# Patient Record
Sex: MALE | Race: OTHER | URBAN - METROPOLITAN AREA
[De-identification: names, ages, dates, MRNs, and addresses within clinical notes are randomized per-mention and may not be internally consistent; named-entity substitution may affect disease eponyms.]

---

## 2018-12-17 ENCOUNTER — INPATIENT (INPATIENT)
Facility: HOSPITAL | Age: 53
LOS: 3 days | Discharge: HOME | End: 2018-12-21
Attending: INTERNAL MEDICINE | Admitting: INTERNAL MEDICINE
Payer: COMMERCIAL

## 2018-12-17 VITALS
OXYGEN SATURATION: 99 % | RESPIRATION RATE: 19 BRPM | DIASTOLIC BLOOD PRESSURE: 92 MMHG | TEMPERATURE: 100 F | HEIGHT: 68 IN | HEART RATE: 144 BPM | SYSTOLIC BLOOD PRESSURE: 125 MMHG | WEIGHT: 145.06 LBS

## 2018-12-17 LAB
ALBUMIN SERPL ELPH-MCNC: 2.4 G/DL — LOW (ref 3.3–5)
ALBUMIN SERPL ELPH-MCNC: 2.7 G/DL — LOW (ref 3.3–5)
ALP SERPL-CCNC: 132 U/L — HIGH (ref 40–120)
ALP SERPL-CCNC: 158 U/L — HIGH (ref 40–120)
ALT FLD-CCNC: 12 U/L — SIGNIFICANT CHANGE UP (ref 12–78)
ALT FLD-CCNC: 15 U/L — SIGNIFICANT CHANGE UP (ref 12–78)
AMYLASE P1 CFR SERPL: 35 U/L — SIGNIFICANT CHANGE UP (ref 25–115)
ANION GAP SERPL CALC-SCNC: 11 MMOL/L — SIGNIFICANT CHANGE UP (ref 5–17)
ANION GAP SERPL CALC-SCNC: 9 MMOL/L — SIGNIFICANT CHANGE UP (ref 5–17)
APTT BLD: 28.9 SEC — SIGNIFICANT CHANGE UP (ref 27.5–36.3)
AST SERPL-CCNC: 26 U/L — SIGNIFICANT CHANGE UP (ref 15–37)
AST SERPL-CCNC: 31 U/L — SIGNIFICANT CHANGE UP (ref 15–37)
BASOPHILS # BLD AUTO: 0.02 K/UL — SIGNIFICANT CHANGE UP (ref 0–0.2)
BASOPHILS NFR BLD AUTO: 0.2 % — SIGNIFICANT CHANGE UP (ref 0–2)
BILIRUB DIRECT SERPL-MCNC: 0.51 MG/DL — HIGH (ref 0.05–0.2)
BILIRUB INDIRECT FLD-MCNC: 0.6 MG/DL — SIGNIFICANT CHANGE UP (ref 0.2–1)
BILIRUB SERPL-MCNC: 1.1 MG/DL — SIGNIFICANT CHANGE UP (ref 0.2–1.2)
BILIRUB SERPL-MCNC: 1.5 MG/DL — HIGH (ref 0.2–1.2)
BUN SERPL-MCNC: 13 MG/DL — SIGNIFICANT CHANGE UP (ref 7–23)
BUN SERPL-MCNC: 14 MG/DL — SIGNIFICANT CHANGE UP (ref 7–23)
CALCIUM SERPL-MCNC: 7.8 MG/DL — LOW (ref 8.5–10.1)
CALCIUM SERPL-MCNC: 8.5 MG/DL — SIGNIFICANT CHANGE UP (ref 8.5–10.1)
CHLORIDE SERPL-SCNC: 87 MMOL/L — LOW (ref 96–108)
CHLORIDE SERPL-SCNC: 95 MMOL/L — LOW (ref 96–108)
CK MB BLD-MCNC: 1.2 % — SIGNIFICANT CHANGE UP (ref 0–3.5)
CK MB CFR SERPL CALC: 1.1 NG/ML — SIGNIFICANT CHANGE UP (ref 0.5–3.6)
CK SERPL-CCNC: 91 U/L — SIGNIFICANT CHANGE UP (ref 26–308)
CO2 SERPL-SCNC: 31 MMOL/L — SIGNIFICANT CHANGE UP (ref 22–31)
CO2 SERPL-SCNC: 33 MMOL/L — HIGH (ref 22–31)
CREAT SERPL-MCNC: 1.1 MG/DL — SIGNIFICANT CHANGE UP (ref 0.5–1.3)
CREAT SERPL-MCNC: 1.24 MG/DL — SIGNIFICANT CHANGE UP (ref 0.5–1.3)
EOSINOPHIL # BLD AUTO: 0.01 K/UL — SIGNIFICANT CHANGE UP (ref 0–0.5)
EOSINOPHIL NFR BLD AUTO: 0.1 % — SIGNIFICANT CHANGE UP (ref 0–6)
ETHANOL SERPL-MCNC: <10 MG/DL — SIGNIFICANT CHANGE UP (ref 0–10)
GLUCOSE BLDC GLUCOMTR-MCNC: 48 MG/DL — LOW (ref 70–99)
GLUCOSE SERPL-MCNC: 81 MG/DL — SIGNIFICANT CHANGE UP (ref 70–99)
GLUCOSE SERPL-MCNC: 93 MG/DL — SIGNIFICANT CHANGE UP (ref 70–99)
HCT VFR BLD CALC: 39.7 % — SIGNIFICANT CHANGE UP (ref 39–50)
HGB BLD-MCNC: 13.1 G/DL — SIGNIFICANT CHANGE UP (ref 13–17)
IMM GRANULOCYTES NFR BLD AUTO: 0.9 % — SIGNIFICANT CHANGE UP (ref 0–1.5)
INR BLD: 1.03 RATIO — SIGNIFICANT CHANGE UP (ref 0.88–1.16)
LIDOCAIN IGE QN: 35 U/L — LOW (ref 73–393)
LYMPHOCYTES # BLD AUTO: 1.08 K/UL — SIGNIFICANT CHANGE UP (ref 1–3.3)
LYMPHOCYTES # BLD AUTO: 10.7 % — LOW (ref 13–44)
MAGNESIUM SERPL-MCNC: 1.3 MG/DL — LOW (ref 1.6–2.6)
MCHC RBC-ENTMCNC: 32.3 PG — SIGNIFICANT CHANGE UP (ref 27–34)
MCHC RBC-ENTMCNC: 33 GM/DL — SIGNIFICANT CHANGE UP (ref 32–36)
MCV RBC AUTO: 98 FL — SIGNIFICANT CHANGE UP (ref 80–100)
MONOCYTES # BLD AUTO: 1.23 K/UL — HIGH (ref 0–0.9)
MONOCYTES NFR BLD AUTO: 12.2 % — SIGNIFICANT CHANGE UP (ref 2–14)
NEUTROPHILS # BLD AUTO: 7.69 K/UL — HIGH (ref 1.8–7.4)
NEUTROPHILS NFR BLD AUTO: 75.9 % — SIGNIFICANT CHANGE UP (ref 43–77)
NRBC # BLD: 0 /100 WBCS — SIGNIFICANT CHANGE UP (ref 0–0)
NT-PROBNP SERPL-SCNC: 434 PG/ML — HIGH (ref 0–125)
PHOSPHATE SERPL-MCNC: 2.5 MG/DL — SIGNIFICANT CHANGE UP (ref 2.5–4.5)
PLATELET # BLD AUTO: 118 K/UL — LOW (ref 150–400)
POTASSIUM SERPL-MCNC: 2.9 MMOL/L — CRITICAL LOW (ref 3.5–5.3)
POTASSIUM SERPL-MCNC: 3.4 MMOL/L — LOW (ref 3.5–5.3)
POTASSIUM SERPL-SCNC: 2.9 MMOL/L — CRITICAL LOW (ref 3.5–5.3)
POTASSIUM SERPL-SCNC: 3.4 MMOL/L — LOW (ref 3.5–5.3)
PROT SERPL-MCNC: 7 GM/DL — SIGNIFICANT CHANGE UP (ref 6–8.3)
PROT SERPL-MCNC: 8 GM/DL — SIGNIFICANT CHANGE UP (ref 6–8.3)
PROTHROM AB SERPL-ACNC: 11.5 SEC — SIGNIFICANT CHANGE UP (ref 10–12.9)
RBC # BLD: 4.05 M/UL — LOW (ref 4.2–5.8)
RBC # FLD: 13.6 % — SIGNIFICANT CHANGE UP (ref 10.3–14.5)
SODIUM SERPL-SCNC: 131 MMOL/L — LOW (ref 135–145)
SODIUM SERPL-SCNC: 135 MMOL/L — SIGNIFICANT CHANGE UP (ref 135–145)
TROPONIN I SERPL-MCNC: <.015 NG/ML — SIGNIFICANT CHANGE UP (ref 0.01–0.04)
WBC # BLD: 10.12 K/UL — SIGNIFICANT CHANGE UP (ref 3.8–10.5)
WBC # FLD AUTO: 10.12 K/UL — SIGNIFICANT CHANGE UP (ref 3.8–10.5)

## 2018-12-17 PROCEDURE — 71045 X-RAY EXAM CHEST 1 VIEW: CPT | Mod: 26

## 2018-12-17 PROCEDURE — 99223 1ST HOSP IP/OBS HIGH 75: CPT

## 2018-12-17 PROCEDURE — 93010 ELECTROCARDIOGRAM REPORT: CPT

## 2018-12-17 PROCEDURE — 99285 EMERGENCY DEPT VISIT HI MDM: CPT

## 2018-12-17 RX ORDER — POTASSIUM CHLORIDE 20 MEQ
10 PACKET (EA) ORAL
Qty: 0 | Refills: 0 | Status: COMPLETED | OUTPATIENT
Start: 2018-12-17 | End: 2018-12-17

## 2018-12-17 RX ORDER — DEXTROSE 50 % IN WATER 50 %
25 SYRINGE (ML) INTRAVENOUS ONCE
Qty: 0 | Refills: 0 | Status: COMPLETED | OUTPATIENT
Start: 2018-12-17 | End: 2018-12-17

## 2018-12-17 RX ORDER — DEXTROSE 50 % IN WATER 50 %
15 SYRINGE (ML) INTRAVENOUS ONCE
Qty: 0 | Refills: 0 | Status: DISCONTINUED | OUTPATIENT
Start: 2018-12-17 | End: 2018-12-21

## 2018-12-17 RX ORDER — DEXTROSE 50 % IN WATER 50 %
25 SYRINGE (ML) INTRAVENOUS ONCE
Qty: 0 | Refills: 0 | Status: DISCONTINUED | OUTPATIENT
Start: 2018-12-17 | End: 2018-12-21

## 2018-12-17 RX ORDER — SODIUM CHLORIDE 9 MG/ML
2000 INJECTION INTRAMUSCULAR; INTRAVENOUS; SUBCUTANEOUS ONCE
Qty: 0 | Refills: 0 | Status: COMPLETED | OUTPATIENT
Start: 2018-12-17 | End: 2018-12-17

## 2018-12-17 RX ORDER — ONDANSETRON 8 MG/1
4 TABLET, FILM COATED ORAL ONCE
Qty: 0 | Refills: 0 | Status: COMPLETED | OUTPATIENT
Start: 2018-12-17 | End: 2018-12-17

## 2018-12-17 RX ORDER — INSULIN LISPRO 100/ML
VIAL (ML) SUBCUTANEOUS AT BEDTIME
Qty: 0 | Refills: 0 | Status: DISCONTINUED | OUTPATIENT
Start: 2018-12-17 | End: 2018-12-21

## 2018-12-17 RX ORDER — GLUCAGON INJECTION, SOLUTION 0.5 MG/.1ML
1 INJECTION, SOLUTION SUBCUTANEOUS ONCE
Qty: 0 | Refills: 0 | Status: DISCONTINUED | OUTPATIENT
Start: 2018-12-17 | End: 2018-12-21

## 2018-12-17 RX ORDER — SODIUM CHLORIDE 9 MG/ML
1000 INJECTION, SOLUTION INTRAVENOUS
Qty: 0 | Refills: 0 | Status: DISCONTINUED | OUTPATIENT
Start: 2018-12-17 | End: 2018-12-19

## 2018-12-17 RX ORDER — ACETAMINOPHEN 500 MG
650 TABLET ORAL EVERY 6 HOURS
Qty: 0 | Refills: 0 | Status: DISCONTINUED | OUTPATIENT
Start: 2018-12-17 | End: 2018-12-21

## 2018-12-17 RX ORDER — FAMOTIDINE 10 MG/ML
20 INJECTION INTRAVENOUS ONCE
Qty: 0 | Refills: 0 | Status: COMPLETED | OUTPATIENT
Start: 2018-12-17 | End: 2018-12-17

## 2018-12-17 RX ORDER — INSULIN LISPRO 100/ML
VIAL (ML) SUBCUTANEOUS
Qty: 0 | Refills: 0 | Status: DISCONTINUED | OUTPATIENT
Start: 2018-12-17 | End: 2018-12-21

## 2018-12-17 RX ORDER — FAMOTIDINE 10 MG/ML
20 INJECTION INTRAVENOUS ONCE
Qty: 0 | Refills: 0 | Status: DISCONTINUED | OUTPATIENT
Start: 2018-12-17 | End: 2018-12-17

## 2018-12-17 RX ORDER — DEXTROSE 50 % IN WATER 50 %
12.5 SYRINGE (ML) INTRAVENOUS ONCE
Qty: 0 | Refills: 0 | Status: DISCONTINUED | OUTPATIENT
Start: 2018-12-17 | End: 2018-12-21

## 2018-12-17 RX ADMIN — Medication 2 MILLIGRAM(S): at 18:27

## 2018-12-17 RX ADMIN — Medication 100 MILLIEQUIVALENT(S): at 15:24

## 2018-12-17 RX ADMIN — Medication 2 MILLIGRAM(S): at 15:24

## 2018-12-17 RX ADMIN — Medication 2 MILLIGRAM(S): at 13:21

## 2018-12-17 RX ADMIN — FAMOTIDINE 20 MILLIGRAM(S): 10 INJECTION INTRAVENOUS at 13:21

## 2018-12-17 RX ADMIN — Medication 2 MILLIGRAM(S): at 22:55

## 2018-12-17 RX ADMIN — Medication 25 GRAM(S): at 23:26

## 2018-12-17 RX ADMIN — Medication 2 MILLIGRAM(S): at 18:47

## 2018-12-17 RX ADMIN — Medication 2 MILLIGRAM(S): at 21:26

## 2018-12-17 RX ADMIN — Medication 100 MILLIEQUIVALENT(S): at 17:49

## 2018-12-17 RX ADMIN — Medication 100 MILLIEQUIVALENT(S): at 13:54

## 2018-12-17 RX ADMIN — SODIUM CHLORIDE 2000 MILLILITER(S): 9 INJECTION INTRAMUSCULAR; INTRAVENOUS; SUBCUTANEOUS at 13:21

## 2018-12-17 RX ADMIN — ONDANSETRON 4 MILLIGRAM(S): 8 TABLET, FILM COATED ORAL at 13:21

## 2018-12-17 NOTE — ED PROVIDER NOTE - OBJECTIVE STATEMENT
53 yo male with history of alcohol use presents with chest pain x3 days. Patient states his last alcoholic beverage was over one week ago. Patient states that chest pain has been constant and cannot provide pain scale number. Patient denies sob, abdominal pain.

## 2018-12-17 NOTE — ED PROVIDER NOTE - PSYCHIATRIC, MLM
Pt bladder scan shows 237cc. Alert and oriented to person, place, time/situation. normal mood and affect. no apparent risk to self or others.

## 2018-12-17 NOTE — H&P ADULT - ASSESSMENT
13.1   10.12 )-----------( 118      ( 17 Dec 2018 13:11 )             39.7   12-17    131<L>  |  87<L>  |  13  ----------------------------<  93  2.9<LL>   |  33<H>  |  1.24    Ca    8.5      17 Dec 2018 13:11    TPro  8.0  /  Alb  2.7<L>  /  TBili  1.5<H>  /  DBili  x   /  AST  31  /  ALT  15  /  AlkPhos  158<H>  12-17  < from: Xray Chest 1 View- PORTABLE-Urgent (12.17.18 @ 14:52) >    Lungs: The lungs are clear.  Heart: The heart is normal in size.  Mediastinum: The mediastinum is within normal limits.    There are bilateral chronic rib deformities.      < end of copied text >

## 2018-12-17 NOTE — ED PROVIDER NOTE - CARE PLAN
Principal Discharge DX:	Alcohol withdrawal  Secondary Diagnosis:	Electrolyte imbalance  Secondary Diagnosis:	Vomiting

## 2018-12-17 NOTE — H&P ADULT - NSHPLABSRESULTS_GEN_ALL_CORE
13.1   10.12 )-----------( 118      ( 17 Dec 2018 13:11 )             39.7   12-17    131<L>  |  87<L>  |  13  ----------------------------<  93  2.9<LL>   |  33<H>  |  1.24    Ca    8.5      17 Dec 2018 13:11    TPro  8.0  /  Alb  2.7<L>  /  TBili  1.5<H>  /  DBili  x   /  AST  31  /  ALT  15  /  AlkPhos  158<H>  12-17 13.1   10.12 )-----------( 118      ( 17 Dec 2018 13:11 )             39.7   12-17    131<L>  |  87<L>  |  13  ----------------------------<  93  2.9<LL>   |  33<H>  |  1.24    Ca    8.5      17 Dec 2018 13:11    TPro  8.0  /  Alb  2.7<L>  /  TBili  1.5<H>  /  DBili  x   /  AST  31  /  ALT  15  /  AlkPhos  158<H>  12-17    trop- NEGATIVE

## 2018-12-17 NOTE — H&P ADULT - NSHPPHYSICALEXAM_GEN_ALL_CORE
GENERAL: NAD well-developed  HEAD:  Atraumatic, Normocephalic  EYES: EOMI, PERRLA, conjunctiva and sclera clear  ENMT: No tonsillar erythema, exudates, or enlargement; Moist mucous membranes, Good dentition, No lesions  NECK: Supple, No JVD, Normal thyroid  NERVOUS SYSTEM:  Alert & Oriented X3, Good concentration; Motor Strength 5/5 B/L upper and lower extremities; DTRs 2+ intact and symmetric  CHEST/LUNG: Clear to percussion bilaterally; No rales, rhonchi, wheezing, or rubs  HEART: Regular rate and rhythm; No murmurs, rubs, or gallops  ABDOMEN: Soft, Nontender, Nondistended; Bowel sounds present  EXTREMITIES:  2+ Peripheral Pulses, No clubbing, cyanosis, or edema  LYMPH: No lymphadenopathy   SKIN: No rashes or lesions ICU Vital Signs Last 24 Hrs  T(C): 36.9 (18 Dec 2018 04:40), Max: 37.7 (17 Dec 2018 12:30)  T(F): 98.4 (18 Dec 2018 04:40), Max: 99.8 (17 Dec 2018 12:30)  HR: 111 (18 Dec 2018 04:40) (111 - 144)  BP: 148/99 (18 Dec 2018 04:40) (125/83 - 159/104)  BP(mean): 98 (17 Dec 2018 15:28) (98 - 98)  ABP: --  ABP(mean): --  RR: 18 (18 Dec 2018 04:40) (14 - 19)  SpO2: 96% (18 Dec 2018 04:40) (96% - 99%)    GENERAL: NAD well-developed  HEAD:  Atraumatic, Normocephalic  EYES: EOMI, PERRLA, conjunctiva and sclera clear  ENMT: No tonsillar erythema, exudates, or enlargement; Moist mucous membranes, Good dentition, No lesions  NECK: Supple, No JVD, Normal thyroid  NERVOUS SYSTEM:  Alert & Oriented X3, Good concentration; Motor Strength 5/5 B/L upper and lower extremities; DTRs 2+ intact and symmetric  CHEST/LUNG: Clear to percussion bilaterally; No rales, rhonchi, wheezing, or rubs  HEART: Regular rate and rhythm; No murmurs, rubs, or gallops  ABDOMEN: Soft, mild upper epigastric tender, Nondistended; Bowel sounds present  EXTREMITIES:  2+ Peripheral Pulses, No clubbing, cyanosis, or edema  LYMPH: No lymphadenopathy   SKIN: No rashes or lesions

## 2018-12-17 NOTE — H&P ADULT - HISTORY OF PRESENT ILLNESS
51 yo male with history of alcohol use presents with chest pain x3 days. Patient states his last alcoholic beverage was over one week ago. Patient states that chest pain has been constant and cannot provide pain scale number. Patient denies sob, abdominal pain. 53 yo male with history of alcohol use presents with chest pain x3 days. Patient states his last alcoholic beverage was over one week ago. Patient states that chest pain has been constant and cannot provide pain scale number. Patient denies sob, abdominal pain.- patient describes the pain as sharp without short of breath and localized in upper epigastric area with constant pain x three days

## 2018-12-17 NOTE — H&P ADULT - NSHPREVIEWOFSYSTEMS_GEN_ALL_CORE

## 2018-12-17 NOTE — ED ADULT TRIAGE NOTE - CHIEF COMPLAINT QUOTE
patient c/o of  chest and epigastric pain , with N/V started 3 days ago , patient admitted drinking  a lot , last drink 3 days ago

## 2018-12-18 DIAGNOSIS — F10.230 ALCOHOL DEPENDENCE WITH WITHDRAWAL, UNCOMPLICATED: ICD-10-CM

## 2018-12-18 DIAGNOSIS — R07.89 OTHER CHEST PAIN: ICD-10-CM

## 2018-12-18 DIAGNOSIS — E11.9 TYPE 2 DIABETES MELLITUS WITHOUT COMPLICATIONS: ICD-10-CM

## 2018-12-18 DIAGNOSIS — R11.10 VOMITING, UNSPECIFIED: ICD-10-CM

## 2018-12-18 DIAGNOSIS — E87.8 OTHER DISORDERS OF ELECTROLYTE AND FLUID BALANCE, NOT ELSEWHERE CLASSIFIED: ICD-10-CM

## 2018-12-18 LAB
ANION GAP SERPL CALC-SCNC: 11 MMOL/L — SIGNIFICANT CHANGE UP (ref 5–17)
BUN SERPL-MCNC: 14 MG/DL — SIGNIFICANT CHANGE UP (ref 7–23)
CALCIUM SERPL-MCNC: 8.2 MG/DL — LOW (ref 8.5–10.1)
CHLORIDE SERPL-SCNC: 96 MMOL/L — SIGNIFICANT CHANGE UP (ref 96–108)
CO2 SERPL-SCNC: 30 MMOL/L — SIGNIFICANT CHANGE UP (ref 22–31)
CREAT SERPL-MCNC: 0.82 MG/DL — SIGNIFICANT CHANGE UP (ref 0.5–1.3)
GLUCOSE BLDC GLUCOMTR-MCNC: 169 MG/DL — HIGH (ref 70–99)
GLUCOSE BLDC GLUCOMTR-MCNC: 83 MG/DL — SIGNIFICANT CHANGE UP (ref 70–99)
GLUCOSE SERPL-MCNC: 34 MG/DL — CRITICAL LOW (ref 70–99)
HBA1C BLD-MCNC: 6.8 % — HIGH (ref 4–5.6)
HCT VFR BLD CALC: 32.3 % — LOW (ref 39–50)
HGB BLD-MCNC: 10.9 G/DL — LOW (ref 13–17)
LACTATE SERPL-SCNC: 0.7 MMOL/L — SIGNIFICANT CHANGE UP (ref 0.7–2)
MAGNESIUM SERPL-MCNC: 1.4 MG/DL — LOW (ref 1.6–2.6)
MCHC RBC-ENTMCNC: 33.7 GM/DL — SIGNIFICANT CHANGE UP (ref 32–36)
MCHC RBC-ENTMCNC: 33.7 PG — SIGNIFICANT CHANGE UP (ref 27–34)
MCV RBC AUTO: 100 FL — SIGNIFICANT CHANGE UP (ref 80–100)
NRBC # BLD: 0 /100 WBCS — SIGNIFICANT CHANGE UP (ref 0–0)
PHOSPHATE SERPL-MCNC: 2.5 MG/DL — SIGNIFICANT CHANGE UP (ref 2.5–4.5)
PLATELET # BLD AUTO: 123 K/UL — LOW (ref 150–400)
POTASSIUM SERPL-MCNC: 3 MMOL/L — LOW (ref 3.5–5.3)
POTASSIUM SERPL-SCNC: 3 MMOL/L — LOW (ref 3.5–5.3)
RAPID RVP RESULT: SIGNIFICANT CHANGE UP
RBC # BLD: 3.23 M/UL — LOW (ref 4.2–5.8)
RBC # FLD: 13.6 % — SIGNIFICANT CHANGE UP (ref 10.3–14.5)
SODIUM SERPL-SCNC: 137 MMOL/L — SIGNIFICANT CHANGE UP (ref 135–145)
TROPONIN I SERPL-MCNC: <.015 NG/ML — SIGNIFICANT CHANGE UP (ref 0.01–0.04)
WBC # BLD: 5.47 K/UL — SIGNIFICANT CHANGE UP (ref 3.8–10.5)
WBC # FLD AUTO: 5.47 K/UL — SIGNIFICANT CHANGE UP (ref 3.8–10.5)

## 2018-12-18 PROCEDURE — 99233 SBSQ HOSP IP/OBS HIGH 50: CPT

## 2018-12-18 RX ORDER — CHLORPROMAZINE HCL 10 MG
25 TABLET ORAL EVERY 8 HOURS
Qty: 0 | Refills: 0 | Status: DISCONTINUED | OUTPATIENT
Start: 2018-12-18 | End: 2018-12-18

## 2018-12-18 RX ORDER — SODIUM CHLORIDE 9 MG/ML
2000 INJECTION INTRAMUSCULAR; INTRAVENOUS; SUBCUTANEOUS ONCE
Qty: 0 | Refills: 0 | Status: COMPLETED | OUTPATIENT
Start: 2018-12-18 | End: 2018-12-18

## 2018-12-18 RX ORDER — CHLORPROMAZINE HCL 10 MG
25 TABLET ORAL EVERY 8 HOURS
Qty: 0 | Refills: 0 | Status: COMPLETED | OUTPATIENT
Start: 2018-12-18 | End: 2018-12-21

## 2018-12-18 RX ORDER — MULTIVIT-MIN/FERROUS GLUCONATE 9 MG/15 ML
1 LIQUID (ML) ORAL DAILY
Qty: 0 | Refills: 0 | Status: DISCONTINUED | OUTPATIENT
Start: 2018-12-18 | End: 2018-12-21

## 2018-12-18 RX ORDER — SODIUM,POTASSIUM PHOSPHATES 278-250MG
1 POWDER IN PACKET (EA) ORAL
Qty: 0 | Refills: 0 | Status: COMPLETED | OUTPATIENT
Start: 2018-12-18 | End: 2018-12-20

## 2018-12-18 RX ORDER — POTASSIUM PHOSPHATE, MONOBASIC POTASSIUM PHOSPHATE, DIBASIC 236; 224 MG/ML; MG/ML
30 INJECTION, SOLUTION INTRAVENOUS ONCE
Qty: 0 | Refills: 0 | Status: COMPLETED | OUTPATIENT
Start: 2018-12-18 | End: 2018-12-18

## 2018-12-18 RX ORDER — FOLIC ACID 0.8 MG
1 TABLET ORAL DAILY
Qty: 0 | Refills: 0 | Status: DISCONTINUED | OUTPATIENT
Start: 2018-12-18 | End: 2018-12-21

## 2018-12-18 RX ORDER — MAGNESIUM SULFATE 500 MG/ML
2 VIAL (ML) INJECTION ONCE
Qty: 0 | Refills: 0 | Status: COMPLETED | OUTPATIENT
Start: 2018-12-18 | End: 2018-12-18

## 2018-12-18 RX ORDER — THIAMINE MONONITRATE (VIT B1) 100 MG
100 TABLET ORAL DAILY
Qty: 0 | Refills: 0 | Status: DISCONTINUED | OUTPATIENT
Start: 2018-12-18 | End: 2018-12-21

## 2018-12-18 RX ORDER — MAGNESIUM OXIDE 400 MG ORAL TABLET 241.3 MG
400 TABLET ORAL
Qty: 0 | Refills: 0 | Status: DISCONTINUED | OUTPATIENT
Start: 2018-12-18 | End: 2018-12-20

## 2018-12-18 RX ADMIN — Medication 50 GRAM(S): at 09:00

## 2018-12-18 RX ADMIN — Medication 2 MILLIGRAM(S): at 08:59

## 2018-12-18 RX ADMIN — POTASSIUM PHOSPHATE, MONOBASIC POTASSIUM PHOSPHATE, DIBASIC 85 MILLIMOLE(S): 236; 224 INJECTION, SOLUTION INTRAVENOUS at 10:18

## 2018-12-18 RX ADMIN — Medication 25 MILLIGRAM(S): at 12:49

## 2018-12-18 RX ADMIN — Medication 1 MILLIGRAM(S): at 12:45

## 2018-12-18 RX ADMIN — MAGNESIUM OXIDE 400 MG ORAL TABLET 400 MILLIGRAM(S): 241.3 TABLET ORAL at 21:25

## 2018-12-18 RX ADMIN — Medication 100 MILLIGRAM(S): at 17:59

## 2018-12-18 RX ADMIN — Medication 200 MILLIGRAM(S): at 21:22

## 2018-12-18 RX ADMIN — Medication 1.5 MILLIGRAM(S): at 21:41

## 2018-12-18 RX ADMIN — Medication 25 MILLIGRAM(S): at 21:23

## 2018-12-18 RX ADMIN — Medication 2 MILLIGRAM(S): at 10:14

## 2018-12-18 RX ADMIN — Medication 1 PACKET(S): at 21:24

## 2018-12-18 RX ADMIN — Medication 650 MILLIGRAM(S): at 12:45

## 2018-12-18 RX ADMIN — Medication 1.5 MILLIGRAM(S): at 18:31

## 2018-12-18 RX ADMIN — SODIUM CHLORIDE 2000 MILLILITER(S): 9 INJECTION INTRAMUSCULAR; INTRAVENOUS; SUBCUTANEOUS at 18:32

## 2018-12-18 RX ADMIN — Medication 650 MILLIGRAM(S): at 18:06

## 2018-12-18 RX ADMIN — Medication 2 MILLIGRAM(S): at 12:44

## 2018-12-18 RX ADMIN — Medication 1 TABLET(S): at 12:58

## 2018-12-18 RX ADMIN — Medication 2 MILLIGRAM(S): at 23:48

## 2018-12-18 RX ADMIN — Medication 2 MILLIGRAM(S): at 19:42

## 2018-12-18 RX ADMIN — Medication 2 MILLIGRAM(S): at 05:55

## 2018-12-18 RX ADMIN — Medication 2 MILLIGRAM(S): at 01:50

## 2018-12-18 RX ADMIN — Medication 2 MILLIGRAM(S): at 21:42

## 2018-12-18 NOTE — ED ADULT NURSE NOTE - NSIMPLEMENTINTERV_GEN_ALL_ED
Implemented All Fall Risk Interventions:  [place] to call system. Call bell, personal items and telephone within reach. Instruct patient to call for assistance. Room bathroom lighting operational. Non-slip footwear when patient is off stretcher. Physically safe environment: no spills, clutter or unnecessary equipment. Stretcher in lowest position, wheels locked, appropriate side rails in place. Provide visual cue, wrist band, yellow gown, etc. Monitor gait and stability. Monitor for mental status changes and reorient to person, place, and time. Review medications for side effects contributing to fall risk. Reinforce activity limits and safety measures with patient and family.

## 2018-12-18 NOTE — PROGRESS NOTE ADULT - ASSESSMENT
53 yo male with history of alcohol use presents with chest pain x3 days. Patient states his last alcoholic beverage was over one week ago. Patient states that chest pain has been constant and cannot provide pain scale number. Patient denies sob, abdominal pain.  Also complains of hiccups.  Admitted for chest pain and electrolyte imbalance along with possible ETOH withdrawal.    Chest pain - unlikely ACS  Trop negative x 2  Echo ordered    Electrolyte Imbalance  Hypomagnesemia, Hypokalemia, Hyponatremia  K+ and Mg replacement ordered  Monitor BMP, Mg, Phos levels  continue IV fluids    ETOH abuse, possible withdrawal  IV fluids  Ativan per CIWA protocol  MVI, Thiamine and Folic acid ordered    Hiccups  Thorazine 25 mg q8h 53 yo male with history of alcohol use presents with chest pain x3 days. Patient states his last alcoholic beverage was over one week ago. Patient states that chest pain has been constant and cannot provide pain scale number. Patient denies sob, abdominal pain.  Also complains of hiccups.  Admitted for chest pain and electrolyte imbalance along with possible ETOH withdrawal.    Chest pain - unlikely ACS  Trop negative x 2  Echo ordered    Electrolyte Imbalance  Hypomagnesemia, Hypokalemia, Hyponatremia  K+ and Mg replacement ordered  Monitor BMP, Mg, Phos levels  continue IV fluids    ETOH abuse, possible withdrawal  IV fluids  Ativan per CIWA protocol  MVI, Thiamine and Folic acid ordered    Hiccups  Thorazine 25 mg q8h     Fever with tachycardia   r/o Sepsis vs alcohol withdrawal  ID consult called - Dr. Bernard BARKER ordered  2L fluid bolus ordered  repeat CBC, check lactic acid level

## 2018-12-18 NOTE — ED ADULT NURSE NOTE - OBJECTIVE STATEMENT
Assumed care of patient at this time with PMH DM, cholecystitis, varicose veins and alcohol use complains of chest pain for 3 days. Patient is tachycardic @140s, states he has not had a drink in over a week. airway patent and clear, IV started and medications given, will continue to monitor and provide care as needed.

## 2018-12-18 NOTE — PROGRESS NOTE ADULT - SUBJECTIVE AND OBJECTIVE BOX
Patient is a 52y old  Male who presents with a chief complaint of chest pain.    INTERVAL HPI/OVERNIGHT EVENTS:  Still reports chest discomfort.  complains of hiccups  States his last alcohol drink was 1.5 months ago.    MEDICATIONS  (STANDING):  chlorproMAZINE    Tablet 25 milliGRAM(s) Oral every 8 hours  dextrose 5%. 1000 milliLiter(s) (50 mL/Hr) IV Continuous <Continuous>  dextrose 50% Injectable 12.5 Gram(s) IV Push once  dextrose 50% Injectable 25 Gram(s) IV Push once  dextrose 50% Injectable 25 Gram(s) IV Push once  insulin lispro (HumaLOG) corrective regimen sliding scale   SubCutaneous three times a day before meals  insulin lispro (HumaLOG) corrective regimen sliding scale   SubCutaneous at bedtime  LORazepam   Injectable   IV Push   LORazepam   Injectable 2 milliGRAM(s) IV Push every 4 hours  LORazepam   Injectable 1.5 milliGRAM(s) IV Push every 4 hours  magnesium oxide 400 milliGRAM(s) Oral two times a day with meals  potassium phosphate / sodium phosphate powder 1 Packet(s) Oral two times a day  potassium phosphate IVPB 30 milliMole(s) IV Intermittent once    MEDICATIONS  (PRN):  acetaminophen   Tablet .. 650 milliGRAM(s) Oral every 6 hours PRN Temp greater or equal to 38C (100.4F), Mild Pain (1 - 3)  dextrose 40% Gel 15 Gram(s) Oral once PRN Blood Glucose LESS THAN 70 milliGRAM(s)/deciliter  glucagon  Injectable 1 milliGRAM(s) IntraMuscular once PRN Glucose LESS THAN 70 milligrams/deciliter  LORazepam   Injectable 2 milliGRAM(s) IV Push every 1 hour PRN CIWA-Ar score 8 or greater    Allergies:  No Known Allergies    REVIEW OF SYSTEMS:  All other systems reviewed and are negative    Vital Signs Last 24 Hrs  T(C): 36.9 (18 Dec 2018 04:40), Max: 37.7 (17 Dec 2018 12:30)  T(F): 98.4 (18 Dec 2018 04:40), Max: 99.8 (17 Dec 2018 12:30)  HR: 111 (18 Dec 2018 04:40) (111 - 144)  BP: 148/99 (18 Dec 2018 04:40) (125/83 - 159/104)  BP(mean): 98 (17 Dec 2018 15:28) (98 - 98)  RR: 18 (18 Dec 2018 04:40) (14 - 19)  SpO2: 96% (18 Dec 2018 04:40) (96% - 99%)  Daily Height in cm: 172.72 (17 Dec 2018 12:30)    Daily Weight in k.1 (18 Dec 2018 04:40)  I&O's Summary    17 Dec 2018 07:01  -  18 Dec 2018 07:00  --------------------------------------------------------  IN: 180 mL / OUT: 300 mL / NET: -120 mL    CAPILLARY BLOOD GLUCOSE    POCT Blood Glucose.: 169 mg/dL (18 Dec 2018 08:17)  POCT Blood Glucose.: 83 mg/dL (18 Dec 2018 01:11)  POCT Blood Glucose.: 48 mg/dL (17 Dec 2018 23:21)    PHYSICAL EXAM:  GENERAL: NAD, well-groomed, well-developed, weak  HEAD:  Atraumatic, Normocephalic  EYES: EOMI, PERRLA, conjunctiva and sclera clear  ENMT: No tonsillar erythema, exudates, or enlargement; Moist mucous membranes, Good dentition, No lesions  NECK: Supple, No JVD, Normal thyroid  NERVOUS SYSTEM:  Alert & Oriented X3, Good concentration; Motor Strength 5/5 B/L upper and lower extremities; DTRs 2+ intact and symmetric  CHEST/LUNG: Clear to percussion bilaterally; No rales, rhonchi, wheezing, or rubs  HEART: Tachycardic; No murmurs, rubs, or gallops  ABDOMEN: Soft, Nontender, Nondistended; Bowel sounds present  EXTREMITIES:  2+ Peripheral Pulses, No clubbing, cyanosis, or edema  LYMPH: No lymphadenopathy noted  SKIN: No rashes or lesions    LABS:                        13.1   10.12 )-----------( 118      ( 17 Dec 2018 13:11 )             39.7     12-18    137  |  96  |  14  ----------------------------<  34<LL>  3.0<L>   |  30  |  0.82    Ca    8.2<L>      18 Dec 2018 06:36  Phos  2.5     12-18  Mg     1.4     12-18    TPro  7.0  /  Alb  2.4<L>  /  TBili  1.1  /  DBili  .51<H>  /  AST  26  /  ALT  12  /  AlkPhos  132<H>  12-17    CARDIAC MARKERS ( 18 Dec 2018 06:36 )  <.015 ng/mL / x     / x     / x     / x      CARDIAC MARKERS ( 17 Dec 2018 13:11 )  <.015 ng/mL / x     / 91 U/L / x     / 1.1 ng/mL      PT/INR - ( 17 Dec 2018 13:11 )   PT: 11.5 sec;   INR: 1.03 ratio    PTT - ( 17 Dec 2018 13:11 )  PTT:28.9 sec    RADIOLOGY & ADDITIONAL TESTS:  < from: Xray Chest 1 View- PORTABLE-Urgent (18 @ 14:52) >  EXAM:  XR CHEST PORTABLE URGENT 1V                            PROCEDURE DATE:  2018          INTERPRETATION:  PROCEDURE: AP view of the chest.    CLINICAL INFORMATION: Shortness of breath.    COMPARISON: None.    FINDINGS:        Lungs: The lungs are clear.  Heart: The heart is normal in size.  Mediastinum: The mediastinum is within normal limits.    There are bilateral chronic rib deformities.    IMPRESSION:    Clear lungs. Patient is a 52y old  Male who presents with a chief complaint of chest pain.    INTERVAL HPI/OVERNIGHT EVENTS:  Still reports chest discomfort.  complains of hiccups  States his last alcohol drink was 1.5 months ago.    MEDICATIONS  (STANDING):  chlorproMAZINE    Tablet 25 milliGRAM(s) Oral every 8 hours  dextrose 5%. 1000 milliLiter(s) (50 mL/Hr) IV Continuous <Continuous>  dextrose 50% Injectable 12.5 Gram(s) IV Push once  dextrose 50% Injectable 25 Gram(s) IV Push once  dextrose 50% Injectable 25 Gram(s) IV Push once  insulin lispro (HumaLOG) corrective regimen sliding scale   SubCutaneous three times a day before meals  insulin lispro (HumaLOG) corrective regimen sliding scale   SubCutaneous at bedtime  LORazepam   Injectable   IV Push   LORazepam   Injectable 2 milliGRAM(s) IV Push every 4 hours  LORazepam   Injectable 1.5 milliGRAM(s) IV Push every 4 hours  magnesium oxide 400 milliGRAM(s) Oral two times a day with meals  potassium phosphate / sodium phosphate powder 1 Packet(s) Oral two times a day  potassium phosphate IVPB 30 milliMole(s) IV Intermittent once    MEDICATIONS  (PRN):  acetaminophen   Tablet .. 650 milliGRAM(s) Oral every 6 hours PRN Temp greater or equal to 38C (100.4F), Mild Pain (1 - 3)  dextrose 40% Gel 15 Gram(s) Oral once PRN Blood Glucose LESS THAN 70 milliGRAM(s)/deciliter  glucagon  Injectable 1 milliGRAM(s) IntraMuscular once PRN Glucose LESS THAN 70 milligrams/deciliter  LORazepam   Injectable 2 milliGRAM(s) IV Push every 1 hour PRN CIWA-Ar score 8 or greater    Allergies:  No Known Allergies    REVIEW OF SYSTEMS:  All other systems reviewed and are negative    Vital Signs Last 24 Hrs  T(C): 39.1 (18 Dec 2018 11:20), Max: 39.1 (18 Dec 2018 11:20)  T(F): 102.3 (18 Dec 2018 11:20), Max: 102.3 (18 Dec 2018 11:20)  HR: 133 (18 Dec 2018 11:20) (111 - 133)  BP: 149/111 (18 Dec 2018 11:20) (125/83 - 159/104)  BP(mean): 98 (17 Dec 2018 15:28) (98 - 98)  RR: 17 (18 Dec 2018 11:20) (16 - 18)  SpO2: 98% (18 Dec 2018 11:20) (96% - 98%)    I&O's Summary    17 Dec 2018 07:01  -  18 Dec 2018 07:00  --------------------------------------------------------  IN: 180 mL / OUT: 300 mL / NET: -120 mL    CAPILLARY BLOOD GLUCOSE    POCT Blood Glucose.: 169 mg/dL (18 Dec 2018 08:17)  POCT Blood Glucose.: 83 mg/dL (18 Dec 2018 01:11)  POCT Blood Glucose.: 48 mg/dL (17 Dec 2018 23:21)    PHYSICAL EXAM:  GENERAL: NAD, well-groomed, well-developed, weak  HEAD:  Atraumatic, Normocephalic  EYES: EOMI, PERRLA, conjunctiva and sclera clear  ENMT: No tonsillar erythema, exudates, or enlargement; Moist mucous membranes, Good dentition, No lesions  NECK: Supple, No JVD, Normal thyroid  NERVOUS SYSTEM:  Alert & Oriented X3, Good concentration; Motor Strength 5/5 B/L upper and lower extremities; DTRs 2+ intact and symmetric  CHEST/LUNG: Clear to percussion bilaterally; No rales, rhonchi, wheezing, or rubs  HEART: Tachycardic; No murmurs, rubs, or gallops  ABDOMEN: Soft, Nontender, Nondistended; Bowel sounds present  EXTREMITIES:  2+ Peripheral Pulses, No clubbing, cyanosis, or edema  LYMPH: No lymphadenopathy noted  SKIN: No rashes or lesions    LABS:                        13.1   10.12 )-----------( 118      ( 17 Dec 2018 13:11 )             39.7     12-18    137  |  96  |  14  ----------------------------<  34<LL>  3.0<L>   |  30  |  0.82    Ca    8.2<L>      18 Dec 2018 06:36  Phos  2.5     12-18  Mg     1.4     12-18    TPro  7.0  /  Alb  2.4<L>  /  TBili  1.1  /  DBili  .51<H>  /  AST  26  /  ALT  12  /  AlkPhos  132<H>  12-17    CARDIAC MARKERS ( 18 Dec 2018 06:36 )  <.015 ng/mL / x     / x     / x     / x      CARDIAC MARKERS ( 17 Dec 2018 13:11 )  <.015 ng/mL / x     / 91 U/L / x     / 1.1 ng/mL      PT/INR - ( 17 Dec 2018 13:11 )   PT: 11.5 sec;   INR: 1.03 ratio    PTT - ( 17 Dec 2018 13:11 )  PTT:28.9 sec    RADIOLOGY & ADDITIONAL TESTS:  < from: Xray Chest 1 View- PORTABLE-Urgent (12.17.18 @ 14:52) >  EXAM:  XR CHEST PORTABLE URGENT 1V                            PROCEDURE DATE:  12/17/2018          INTERPRETATION:  PROCEDURE: AP view of the chest.    CLINICAL INFORMATION: Shortness of breath.    COMPARISON: None.    FINDINGS:        Lungs: The lungs are clear.  Heart: The heart is normal in size.  Mediastinum: The mediastinum is within normal limits.    There are bilateral chronic rib deformities.    IMPRESSION:    Clear lungs.

## 2018-12-19 LAB
ANION GAP SERPL CALC-SCNC: 9 MMOL/L — SIGNIFICANT CHANGE UP (ref 5–17)
BUN SERPL-MCNC: 13 MG/DL — SIGNIFICANT CHANGE UP (ref 7–23)
CALCIUM SERPL-MCNC: 8.4 MG/DL — LOW (ref 8.5–10.1)
CHLORIDE SERPL-SCNC: 97 MMOL/L — SIGNIFICANT CHANGE UP (ref 96–108)
CO2 SERPL-SCNC: 31 MMOL/L — SIGNIFICANT CHANGE UP (ref 22–31)
CREAT SERPL-MCNC: 0.98 MG/DL — SIGNIFICANT CHANGE UP (ref 0.5–1.3)
GLUCOSE SERPL-MCNC: 120 MG/DL — HIGH (ref 70–99)
MAGNESIUM SERPL-MCNC: 2 MG/DL — SIGNIFICANT CHANGE UP (ref 1.6–2.6)
PHOSPHATE SERPL-MCNC: 2.6 MG/DL — SIGNIFICANT CHANGE UP (ref 2.5–4.5)
POTASSIUM SERPL-MCNC: 3.6 MMOL/L — SIGNIFICANT CHANGE UP (ref 3.5–5.3)
POTASSIUM SERPL-SCNC: 3.6 MMOL/L — SIGNIFICANT CHANGE UP (ref 3.5–5.3)
SODIUM SERPL-SCNC: 137 MMOL/L — SIGNIFICANT CHANGE UP (ref 135–145)

## 2018-12-19 PROCEDURE — 99233 SBSQ HOSP IP/OBS HIGH 50: CPT

## 2018-12-19 PROCEDURE — 99221 1ST HOSP IP/OBS SF/LOW 40: CPT

## 2018-12-19 PROCEDURE — 71045 X-RAY EXAM CHEST 1 VIEW: CPT | Mod: 26

## 2018-12-19 PROCEDURE — 93306 TTE W/DOPPLER COMPLETE: CPT | Mod: 26

## 2018-12-19 RX ORDER — METOPROLOL TARTRATE 50 MG
25 TABLET ORAL DAILY
Qty: 0 | Refills: 0 | Status: DISCONTINUED | OUTPATIENT
Start: 2018-12-19 | End: 2018-12-21

## 2018-12-19 RX ADMIN — Medication 2 MILLIGRAM(S): at 04:15

## 2018-12-19 RX ADMIN — Medication 2 MILLIGRAM(S): at 06:17

## 2018-12-19 RX ADMIN — MAGNESIUM OXIDE 400 MG ORAL TABLET 400 MILLIGRAM(S): 241.3 TABLET ORAL at 18:26

## 2018-12-19 RX ADMIN — Medication 1 MILLIGRAM(S): at 18:29

## 2018-12-19 RX ADMIN — Medication 25 MILLIGRAM(S): at 06:17

## 2018-12-19 RX ADMIN — Medication 1 PACKET(S): at 18:26

## 2018-12-19 RX ADMIN — Medication 1.5 MILLIGRAM(S): at 12:46

## 2018-12-19 RX ADMIN — Medication 1 MILLIGRAM(S): at 18:28

## 2018-12-19 RX ADMIN — Medication 1 PACKET(S): at 06:17

## 2018-12-19 RX ADMIN — Medication 25 MILLIGRAM(S): at 18:27

## 2018-12-19 RX ADMIN — Medication 2 MILLIGRAM(S): at 12:43

## 2018-12-19 RX ADMIN — Medication 1.5 MILLIGRAM(S): at 02:30

## 2018-12-19 RX ADMIN — Medication 2 MILLIGRAM(S): at 12:44

## 2018-12-19 RX ADMIN — Medication 1.5 MILLIGRAM(S): at 06:12

## 2018-12-19 RX ADMIN — Medication 2 MILLIGRAM(S): at 13:22

## 2018-12-19 RX ADMIN — Medication 200 MILLIGRAM(S): at 06:18

## 2018-12-19 RX ADMIN — Medication 1 MILLIGRAM(S): at 21:25

## 2018-12-19 RX ADMIN — Medication 2 MILLIGRAM(S): at 01:13

## 2018-12-19 RX ADMIN — Medication 25 MILLIGRAM(S): at 21:25

## 2018-12-19 NOTE — PROGRESS NOTE ADULT - ASSESSMENT
51 yo male with history of alcohol use presents with chest pain x3 days. Patient states his last alcoholic beverage was over one week ago. Patient states that chest pain has been constant and cannot provide pain scale number. Patient denies sob, abdominal pain.  Also complains of hiccups.  Admitted for chest pain and electrolyte imbalance along with possible ETOH withdrawal.    Chest pain - unlikely ACS  Trop negative x 2  Echo ordered    Electrolyte Imbalance  Hypomagnesemia, Hypokalemia, Hyponatremia  Monitor BMP, Mg, Phos levels  continue IV fluids    ETOH abuse, possible withdrawal  IV fluids  Ativan per CIWA protocol  MVI, Thiamine and Folic acid ordered  added Toprol 25 mg daily    Hiccups  Thorazine 25 mg q8h     Fever with tachycardia   r/o Sepsis vs alcohol withdrawal  Cough  ID consult called - Dr. Wagner  RVP negative  2L fluid bolus given yesterday  Repeat CXR ordered  repeat CBC and lactic acid level wnl

## 2018-12-19 NOTE — CONSULT NOTE ADULT - SUBJECTIVE AND OBJECTIVE BOX
Infectious Diseases - Attending at Dr. Ridley    HPI:  51 yo male with history of alcohol use presents with chest pain x3 days. Patient states his last alcoholic beverage was over one week ago. Patient states that chest pain has been constant and cannot provide pain scale number. Patient denies sob, abdominal pain.- patient describes the pain as sharp without short of breath and localized in upper epigastric area with constant pain x three days (17 Dec 2018 15:31)      PAST MEDICAL & SURGICAL HISTORY:  Varicose veins  Diabetes mellitus: type 2  Cholecystitis  No significant past surgical history      Allergies    No Known Allergies    Intolerances        FAMILY HISTORY:      SOCIAL HISTORY:    REVIEW OF SYSTEMS:    Constitutional: No fever, weight loss or fatigue  Eyes: No eye pain, visual disturbances, or discharge  ENT:  No difficulty hearing, tinnitus, vertigo; No sinus or throat pain  Neck: No pain or stiffness  Respiratory: No cough, wheezing, chills or hemoptysis  Cardiovascular: No chest pain, palpitations, shortness of breath, dizziness or leg swelling  Gastrointestinal: No abdominal or epigastric pain. No nausea, vomiting or hematemesis; No diarrhea or constipation. No melena or hematochezia.  Genitourinary: No dysuria, frequency, hematuria or incontinence  Neurological: No headaches, memory loss, loss of strength, numbness or tremors  Skin: No itching, burning, rashes or lesions       MEDICATIONS  (STANDING):  chlorproMAZINE    Tablet 25 milliGRAM(s) Oral every 8 hours  dextrose 50% Injectable 12.5 Gram(s) IV Push once  dextrose 50% Injectable 25 Gram(s) IV Push once  dextrose 50% Injectable 25 Gram(s) IV Push once  folic acid 1 milliGRAM(s) Oral daily  insulin lispro (HumaLOG) corrective regimen sliding scale   SubCutaneous three times a day before meals  insulin lispro (HumaLOG) corrective regimen sliding scale   SubCutaneous at bedtime  LORazepam   Injectable   IV Push   LORazepam   Injectable 1 milliGRAM(s) IV Push every 4 hours  magnesium oxide 400 milliGRAM(s) Oral two times a day with meals  metoprolol succinate ER 25 milliGRAM(s) Oral daily  multivitamin/minerals 1 Tablet(s) Oral daily  potassium phosphate / sodium phosphate powder 1 Packet(s) Oral two times a day  thiamine 100 milliGRAM(s) Oral daily    MEDICATIONS  (PRN):  acetaminophen   Tablet .. 650 milliGRAM(s) Oral every 6 hours PRN Temp greater or equal to 38C (100.4F), Mild Pain (1 - 3)  benzonatate 200 milliGRAM(s) Oral every 8 hours PRN Cough  dextrose 40% Gel 15 Gram(s) Oral once PRN Blood Glucose LESS THAN 70 milliGRAM(s)/deciliter  glucagon  Injectable 1 milliGRAM(s) IntraMuscular once PRN Glucose LESS THAN 70 milligrams/deciliter  LORazepam   Injectable 2 milliGRAM(s) IV Push every 1 hour PRN CIWA-Ar score 8 or greater      Vital Signs Last 24 Hrs  T(C): 36.9 (19 Dec 2018 11:02), Max: 37.1 (18 Dec 2018 20:48)  T(F): 98.4 (19 Dec 2018 11:02), Max: 98.8 (18 Dec 2018 20:48)  HR: 126 (19 Dec 2018 11:02) (100 - 143)  BP: 161/115 (19 Dec 2018 11:02) (124/94 - 161/115)  BP(mean): --  RR: 19 (19 Dec 2018 11:02) (17 - 19)  SpO2: 96% (19 Dec 2018 11:02) (96% - 98%)    PHYSICAL EXAM:    Constitutional: NAD, well-groomed, well-developed  HEENT: PERRLA, EOMI, Normal Hearing, MMM  Neck: No LAD, No JVD  Back: Normal spine flexure, No CVA tenderness  Respiratory: CTAB/L  Cardiovascular: S1 and S2, RRR, no M/G/R  Gastrointestinal: BS+, soft, NT/ND  Extremities: No peripheral edema  Vascular: 2+ peripheral pulses  Neurological: A/O x 3, no focal deficits  Skin: No rashes      LABS:                        10.9   5.47  )-----------( 123      ( 18 Dec 2018 15:48 )             32.3     12-19    137  |  97  |  13  ----------------------------<  120<H>  3.6   |  31  |  0.98    Ca    8.4<L>      19 Dec 2018 06:21  Phos  2.6     12-19  Mg     2.0     12-19    TPro  7.0  /  Alb  2.4<L>  /  TBili  1.1  /  DBili  .51<H>  /  AST  26  /  ALT  12  /  AlkPhos  132<H>  12-17          MICROBIOLOGY:  RECENT CULTURES:        RADIOLOGY & ADDITIONAL STUDIES: Infectious Diseases - Attending at Dr. Ridley    HPI:  51 yo male with history of alcohol use presents with chest pain x3 days. Patient states his last alcoholic beverage was over one week ago. Patient states that chest pain has been constant and cannot provide pain scale number. Patient denies sob, abdominal pain.- patient describes the pain as sharp without short of breath and localized in upper epigastric area with constant pain x three days (17 Dec 2018 15:31)  pt very lethargic received medication for agitation due to alcohol withdrawal .Per nurse coughing yellow phlegm      PAST MEDICAL & SURGICAL HISTORY:  Varicose veins  Diabetes mellitus: type 2  Cholecystitis  No significant past surgical history      Allergies    No Known Allergies    Intolerances        FAMILY HISTORY: non contributory       SOCIAL HISTORY: alcoholic ,smoker    REVIEW OF SYSTEMS:  per H/P pt too lethargic      MEDICATIONS  (STANDING):  chlorproMAZINE    Tablet 25 milliGRAM(s) Oral every 8 hours  dextrose 50% Injectable 12.5 Gram(s) IV Push once  dextrose 50% Injectable 25 Gram(s) IV Push once  dextrose 50% Injectable 25 Gram(s) IV Push once  folic acid 1 milliGRAM(s) Oral daily  insulin lispro (HumaLOG) corrective regimen sliding scale   SubCutaneous three times a day before meals  insulin lispro (HumaLOG) corrective regimen sliding scale   SubCutaneous at bedtime  LORazepam   Injectable   IV Push   LORazepam   Injectable 1 milliGRAM(s) IV Push every 4 hours  magnesium oxide 400 milliGRAM(s) Oral two times a day with meals  metoprolol succinate ER 25 milliGRAM(s) Oral daily  multivitamin/minerals 1 Tablet(s) Oral daily  potassium phosphate / sodium phosphate powder 1 Packet(s) Oral two times a day  thiamine 100 milliGRAM(s) Oral daily    MEDICATIONS  (PRN):  acetaminophen   Tablet .. 650 milliGRAM(s) Oral every 6 hours PRN Temp greater or equal to 38C (100.4F), Mild Pain (1 - 3)  benzonatate 200 milliGRAM(s) Oral every 8 hours PRN Cough  dextrose 40% Gel 15 Gram(s) Oral once PRN Blood Glucose LESS THAN 70 milliGRAM(s)/deciliter  glucagon  Injectable 1 milliGRAM(s) IntraMuscular once PRN Glucose LESS THAN 70 milligrams/deciliter  LORazepam   Injectable 2 milliGRAM(s) IV Push every 1 hour PRN CIWA-Ar score 8 or greater      Vital Signs Last 24 Hrs  T(C): 36.9 (19 Dec 2018 11:02), Max: 37.1 (18 Dec 2018 20:48)  T(F): 98.4 (19 Dec 2018 11:02), Max: 98.8 (18 Dec 2018 20:48)  HR: 126 (19 Dec 2018 11:02) (100 - 143)  BP: 161/115 (19 Dec 2018 11:02) (124/94 - 161/115)  BP(mean): --  RR: 19 (19 Dec 2018 11:02) (17 - 19)  SpO2: 96% (19 Dec 2018 11:02) (96% - 98%)    PHYSICAL EXAM:    Constitutional: NAD, well-groomed, well-developed  HEENT: PERRL  Neck: No LAD, No JVD  Back: Normal spine flexure  Respiratory: CTAB/L  Cardiovascular: S1 and S2, RRR, no M/G/R  Gastrointestinal: BS+, soft, NT/ND  Extremities: No peripheral edema  Vascular: 2+ peripheral pulses  Neurological: moves all extremities  Skin: No rashes      LABS:                        10.9   5.47  )-----------( 123      ( 18 Dec 2018 15:48 )             32.3     12-19    137  |  97  |  13  ----------------------------<  120<H>  3.6   |  31  |  0.98    Ca    8.4<L>      19 Dec 2018 06:21  Phos  2.6     12-19  Mg     2.0     12-19    TPro  7.0  /  Alb  2.4<L>  /  TBili  1.1  /  DBili  .51<H>  /  AST  26  /  ALT  12  /  AlkPhos  132<H>  12-17          MICROBIOLOGY:  RECENT CULTURES:        RADIOLOGY & ADDITIONAL STUDIES:  Xray chest :clear

## 2018-12-19 NOTE — PROGRESS NOTE ADULT - SUBJECTIVE AND OBJECTIVE BOX
Patient is a 52y old  Male who presents with a chief complaint of chest pain.    INTERVAL HPI/OVERNIGHT EVENTS:  Sleeping this morning.  No complaints.    MEDICATIONS  (STANDING):  chlorproMAZINE    Tablet 25 milliGRAM(s) Oral every 8 hours  dextrose 5%. 1000 milliLiter(s) (50 mL/Hr) IV Continuous <Continuous>  dextrose 50% Injectable 12.5 Gram(s) IV Push once  dextrose 50% Injectable 25 Gram(s) IV Push once  dextrose 50% Injectable 25 Gram(s) IV Push once  folic acid 1 milliGRAM(s) Oral daily  insulin lispro (HumaLOG) corrective regimen sliding scale   SubCutaneous three times a day before meals  insulin lispro (HumaLOG) corrective regimen sliding scale   SubCutaneous at bedtime  LORazepam   Injectable   IV Push   LORazepam   Injectable 1.5 milliGRAM(s) IV Push every 4 hours  LORazepam   Injectable 1 milliGRAM(s) IV Push every 4 hours  magnesium oxide 400 milliGRAM(s) Oral two times a day with meals  multivitamin/minerals 1 Tablet(s) Oral daily  potassium phosphate / sodium phosphate powder 1 Packet(s) Oral two times a day  thiamine 100 milliGRAM(s) Oral daily    MEDICATIONS  (PRN):  acetaminophen   Tablet .. 650 milliGRAM(s) Oral every 6 hours PRN Temp greater or equal to 38C (100.4F), Mild Pain (1 - 3)  benzonatate 200 milliGRAM(s) Oral every 8 hours PRN Cough  dextrose 40% Gel 15 Gram(s) Oral once PRN Blood Glucose LESS THAN 70 milliGRAM(s)/deciliter  glucagon  Injectable 1 milliGRAM(s) IntraMuscular once PRN Glucose LESS THAN 70 milligrams/deciliter  LORazepam   Injectable 2 milliGRAM(s) IV Push every 1 hour PRN CIWA-Ar score 8 or greater    Allergies:  No Known Allergies    REVIEW OF SYSTEMS:  All other systems reviewed and are negative    Vital Signs Last 24 Hrs  T(C): 36.8 (19 Dec 2018 05:24), Max: 39.1 (18 Dec 2018 11:20)  T(F): 98.2 (19 Dec 2018 05:24), Max: 102.3 (18 Dec 2018 11:20)  HR: 140 (19 Dec 2018 05:24) (94 - 143)  BP: 126/92 (19 Dec 2018 05:24) (124/94 - 149/111)  BP(mean): --  RR: 17 (19 Dec 2018 05:24) (17 - 18)  SpO2: 96% (19 Dec 2018 05:24) (96% - 98%)    CAPILLARY BLOOD GLUCOSE    POCT Blood Glucose.: 131 mg/dL (19 Dec 2018 07:56)  POCT Blood Glucose.: 152 mg/dL (18 Dec 2018 21:33)  POCT Blood Glucose.: 64 mg/dL (18 Dec 2018 16:31)  POCT Blood Glucose.: 61 mg/dL (18 Dec 2018 13:13)    PHYSICAL EXAM:  GENERAL: NAD, well-groomed, well-developed, weak  HEAD:  Atraumatic, Normocephalic  EYES: EOMI, PERRLA, conjunctiva and sclera clear  ENMT: No tonsillar erythema, exudates, or enlargement; Moist mucous membranes, Good dentition, No lesions  NECK: Supple, No JVD, Normal thyroid  NERVOUS SYSTEM:  Alert & Oriented X3, Motor Strength 5/5 B/L upper and lower extremities; DTRs 2+ intact and symmetric  CHEST/LUNG: Clear to percussion bilaterally; No rales, rhonchi, wheezing, or rubs  HEART: Tachycardic; No murmurs, rubs, or gallops  ABDOMEN: Soft, Nontender, Nondistended; Bowel sounds present  EXTREMITIES:  2+ Peripheral Pulses, No clubbing, cyanosis, or edema  LYMPH: No lymphadenopathy noted  SKIN: No rashes or lesions    LABS/RADIOLOGY RESULTS:                          10.9   5.47  )-----------( 123      ( 18 Dec 2018 15:48 )             32.3   12-19    137  |  97  |  13  ----------------------------<  120<H>  3.6   |  31  |  0.98    Ca    8.4<L>      19 Dec 2018 06:21  Phos  2.6     12-19  Mg     2.0     12-19    TPro  7.0  /  Alb  2.4<L>  /  TBili  1.1  /  DBili  .51<H>  /  AST  26  /  ALT  12  /  AlkPhos  132<H>  12-17  PT/INR - ( 17 Dec 2018 13:11 )   PT: 11.5 sec;   INR: 1.03 ratio    PTT - ( 17 Dec 2018 13:11 )  PTT:28.9 sec    Rapid RVP Result: NotDetec: This Respiratory Panel uses polymerase chain reaction (PCR) to detect for  adenovirus; coronavirus (HKU1, NL63, 229E, OC43); human metapneumovirus  (hMPV); human enterovirus/rhinovirus (Entero/RV); influenza A; influenza  A/H1; influenza A/H3; influenza A/H1-2009; influenza B; parainfluenza  viruses 1, 2, 3, 4; respiratory syncytial virus; Mycoplasma pneumoniae;  and Chlamydophila pneumoniae. (12.18.18 @ 19:23)

## 2018-12-20 DIAGNOSIS — R50.9 FEVER, UNSPECIFIED: ICD-10-CM

## 2018-12-20 LAB
ANION GAP SERPL CALC-SCNC: 10 MMOL/L — SIGNIFICANT CHANGE UP (ref 5–17)
ANION GAP SERPL CALC-SCNC: 10 MMOL/L — SIGNIFICANT CHANGE UP (ref 5–17)
BUN SERPL-MCNC: 11 MG/DL — SIGNIFICANT CHANGE UP (ref 7–23)
BUN SERPL-MCNC: 12 MG/DL — SIGNIFICANT CHANGE UP (ref 7–23)
CALCIUM SERPL-MCNC: 8 MG/DL — LOW (ref 8.5–10.1)
CALCIUM SERPL-MCNC: 8.4 MG/DL — LOW (ref 8.5–10.1)
CHLORIDE SERPL-SCNC: 98 MMOL/L — SIGNIFICANT CHANGE UP (ref 96–108)
CHLORIDE SERPL-SCNC: 99 MMOL/L — SIGNIFICANT CHANGE UP (ref 96–108)
CO2 SERPL-SCNC: 29 MMOL/L — SIGNIFICANT CHANGE UP (ref 22–31)
CO2 SERPL-SCNC: 30 MMOL/L — SIGNIFICANT CHANGE UP (ref 22–31)
CREAT SERPL-MCNC: 0.78 MG/DL — SIGNIFICANT CHANGE UP (ref 0.5–1.3)
CREAT SERPL-MCNC: 0.82 MG/DL — SIGNIFICANT CHANGE UP (ref 0.5–1.3)
GLUCOSE SERPL-MCNC: 219 MG/DL — HIGH (ref 70–99)
GLUCOSE SERPL-MCNC: 83 MG/DL — SIGNIFICANT CHANGE UP (ref 70–99)
HCT VFR BLD CALC: 33.4 % — LOW (ref 39–50)
HGB BLD-MCNC: 10.8 G/DL — LOW (ref 13–17)
MAGNESIUM SERPL-MCNC: 1.9 MG/DL — SIGNIFICANT CHANGE UP (ref 1.6–2.6)
MAGNESIUM SERPL-MCNC: 2.2 MG/DL — SIGNIFICANT CHANGE UP (ref 1.6–2.6)
MCHC RBC-ENTMCNC: 32.3 GM/DL — SIGNIFICANT CHANGE UP (ref 32–36)
MCHC RBC-ENTMCNC: 32.5 PG — SIGNIFICANT CHANGE UP (ref 27–34)
MCV RBC AUTO: 100.6 FL — HIGH (ref 80–100)
NRBC # BLD: 0 /100 WBCS — SIGNIFICANT CHANGE UP (ref 0–0)
PHOSPHATE SERPL-MCNC: 3 MG/DL — SIGNIFICANT CHANGE UP (ref 2.5–4.5)
PHOSPHATE SERPL-MCNC: 3.1 MG/DL — SIGNIFICANT CHANGE UP (ref 2.5–4.5)
PLATELET # BLD AUTO: 124 K/UL — LOW (ref 150–400)
POTASSIUM SERPL-MCNC: 3.3 MMOL/L — LOW (ref 3.5–5.3)
POTASSIUM SERPL-MCNC: 3.8 MMOL/L — SIGNIFICANT CHANGE UP (ref 3.5–5.3)
POTASSIUM SERPL-SCNC: 3.3 MMOL/L — LOW (ref 3.5–5.3)
POTASSIUM SERPL-SCNC: 3.8 MMOL/L — SIGNIFICANT CHANGE UP (ref 3.5–5.3)
RBC # BLD: 3.32 M/UL — LOW (ref 4.2–5.8)
RBC # FLD: 13.4 % — SIGNIFICANT CHANGE UP (ref 10.3–14.5)
SODIUM SERPL-SCNC: 138 MMOL/L — SIGNIFICANT CHANGE UP (ref 135–145)
SODIUM SERPL-SCNC: 138 MMOL/L — SIGNIFICANT CHANGE UP (ref 135–145)
WBC # BLD: 5.35 K/UL — SIGNIFICANT CHANGE UP (ref 3.8–10.5)
WBC # FLD AUTO: 5.35 K/UL — SIGNIFICANT CHANGE UP (ref 3.8–10.5)

## 2018-12-20 PROCEDURE — 99233 SBSQ HOSP IP/OBS HIGH 50: CPT

## 2018-12-20 PROCEDURE — 73140 X-RAY EXAM OF FINGER(S): CPT | Mod: 26,LT

## 2018-12-20 RX ORDER — POTASSIUM CHLORIDE 20 MEQ
40 PACKET (EA) ORAL ONCE
Qty: 0 | Refills: 0 | Status: COMPLETED | OUTPATIENT
Start: 2018-12-20 | End: 2018-12-20

## 2018-12-20 RX ORDER — POVIDONE-IODINE 5 %
1 AEROSOL (ML) TOPICAL
Qty: 0 | Refills: 0 | Status: DISCONTINUED | OUTPATIENT
Start: 2018-12-20 | End: 2018-12-21

## 2018-12-20 RX ORDER — LACTOBACILLUS ACIDOPHILUS 100MM CELL
1 CAPSULE ORAL
Qty: 0 | Refills: 0 | Status: DISCONTINUED | OUTPATIENT
Start: 2018-12-20 | End: 2018-12-21

## 2018-12-20 RX ORDER — MAGNESIUM SULFATE 500 MG/ML
1 VIAL (ML) INJECTION ONCE
Qty: 0 | Refills: 0 | Status: COMPLETED | OUTPATIENT
Start: 2018-12-20 | End: 2018-12-20

## 2018-12-20 RX ADMIN — Medication 25 MILLIGRAM(S): at 23:00

## 2018-12-20 RX ADMIN — Medication 1 APPLICATION(S): at 22:51

## 2018-12-20 RX ADMIN — Medication 25 MILLIGRAM(S): at 05:37

## 2018-12-20 RX ADMIN — Medication 1 TABLET(S): at 17:28

## 2018-12-20 RX ADMIN — Medication 40 MILLIEQUIVALENT(S): at 08:29

## 2018-12-20 RX ADMIN — Medication 0.5 MILLIGRAM(S): at 17:12

## 2018-12-20 RX ADMIN — Medication 1 TABLET(S): at 12:43

## 2018-12-20 RX ADMIN — Medication 200 MILLIGRAM(S): at 05:36

## 2018-12-20 RX ADMIN — Medication 1 MILLIGRAM(S): at 10:49

## 2018-12-20 RX ADMIN — Medication 25 MILLIGRAM(S): at 13:16

## 2018-12-20 RX ADMIN — Medication 0.5 MILLIGRAM(S): at 23:00

## 2018-12-20 RX ADMIN — Medication 1: at 17:29

## 2018-12-20 RX ADMIN — Medication 100 MILLIGRAM(S): at 12:42

## 2018-12-20 RX ADMIN — Medication 1 MILLIGRAM(S): at 05:36

## 2018-12-20 RX ADMIN — Medication 1 PACKET(S): at 05:37

## 2018-12-20 RX ADMIN — Medication 1 MILLIGRAM(S): at 12:42

## 2018-12-20 RX ADMIN — Medication 1 MILLIGRAM(S): at 02:07

## 2018-12-20 RX ADMIN — Medication 2: at 13:53

## 2018-12-20 RX ADMIN — MAGNESIUM OXIDE 400 MG ORAL TABLET 400 MILLIGRAM(S): 241.3 TABLET ORAL at 08:29

## 2018-12-20 RX ADMIN — Medication 100 GRAM(S): at 08:29

## 2018-12-20 RX ADMIN — Medication 1 MILLIGRAM(S): at 13:46

## 2018-12-20 NOTE — PROGRESS NOTE ADULT - ASSESSMENT
53 yo male with history of alcohol use presents with chest pain x3 days. Patient states his last alcoholic beverage was over one week ago. Patient states that chest pain has been constant and cannot provide pain scale number. Patient denies sob, abdominal pain.  Also complains of hiccups.  Admitted for chest pain and electrolyte imbalance along with possible ETOH withdrawal.    Chest pain - unlikely ACS  Trop negative x 2  Echo results pending    Electrolyte Imbalance  Hypomagnesemia, Hypokalemia, Hyponatremia  K+ and Mg replacement ordered  Monitor BMP, Mg, Phos levels    ETOH abuse, possible withdrawal  Continue Ativan per CIWA protocol  MVI, Thiamine and Folic acid ordered  added Toprol 25 mg daily    Hiccups  Thorazine 25 mg q8h     Fever with tachycardia, likely due to alcohol withdrawal  Sepsis ruled out  ID consult appreciated  RVP negative  Repeat CXR negative  repeat CBC and lactic acid level wnl

## 2018-12-20 NOTE — PROGRESS NOTE ADULT - SUBJECTIVE AND OBJECTIVE BOX
Patient is a 52y old  Male who presents with a chief complaint of chest pain.    INTERVAL HPI/OVERNIGHT EVENTS:  Very Sleepy this morning.  No complaints.    MEDICATIONS  (STANDING):  chlorproMAZINE    Tablet 25 milliGRAM(s) Oral every 8 hours  dextrose 50% Injectable 12.5 Gram(s) IV Push once  dextrose 50% Injectable 25 Gram(s) IV Push once  dextrose 50% Injectable 25 Gram(s) IV Push once  folic acid 1 milliGRAM(s) Oral daily  insulin lispro (HumaLOG) corrective regimen sliding scale   SubCutaneous three times a day before meals  insulin lispro (HumaLOG) corrective regimen sliding scale   SubCutaneous at bedtime  LORazepam   Injectable   IV Push   LORazepam   Injectable 1 milliGRAM(s) IV Push every 4 hours  LORazepam   Injectable 0.5 milliGRAM(s) IV Push every 4 hours  magnesium oxide 400 milliGRAM(s) Oral two times a day with meals  magnesium sulfate  IVPB 1 Gram(s) IV Intermittent once  metoprolol succinate ER 25 milliGRAM(s) Oral daily  multivitamin/minerals 1 Tablet(s) Oral daily  potassium chloride    Tablet ER 40 milliEquivalent(s) Oral once  thiamine 100 milliGRAM(s) Oral daily    MEDICATIONS  (PRN):  acetaminophen   Tablet .. 650 milliGRAM(s) Oral every 6 hours PRN Temp greater or equal to 38C (100.4F), Mild Pain (1 - 3)  benzonatate 200 milliGRAM(s) Oral every 8 hours PRN Cough  dextrose 40% Gel 15 Gram(s) Oral once PRN Blood Glucose LESS THAN 70 milliGRAM(s)/deciliter  glucagon  Injectable 1 milliGRAM(s) IntraMuscular once PRN Glucose LESS THAN 70 milligrams/deciliter  LORazepam   Injectable 2 milliGRAM(s) IV Push every 1 hour PRN CIWA-Ar score 8 or greater    Vital Signs Last 24 Hrs    T(C): 36.4 (20 Dec 2018 05:48), Max: 37.3 (19 Dec 2018 18:20)  T(F): 97.6 (20 Dec 2018 05:48), Max: 99.1 (19 Dec 2018 18:20)  HR: 106 (20 Dec 2018 05:48) (104 - 126)  BP: 134/80 (20 Dec 2018 05:48) (131/81 - 161/115)  BP(mean): --  RR: 19 (20 Dec 2018 00:16) (19 - 28)  SpO2: 96% (20 Dec 2018 00:16) (95% - 99%)  Allergies:  No Known Allergies    CAPILLARY BLOOD GLUCOSE    POCT Blood Glucose.: 117 mg/dL (20 Dec 2018 08:24)  POCT Blood Glucose.: 123 mg/dL (19 Dec 2018 21:34)  POCT Blood Glucose.: 91 mg/dL (19 Dec 2018 16:50)  POCT Blood Glucose.: 108 mg/dL (19 Dec 2018 12:33)    PHYSICAL EXAM:  GENERAL: NAD, well-groomed, well-developed, weak  HEAD:  Atraumatic, Normocephalic  EYES: EOMI, PERRLA, conjunctiva and sclera clear  ENMT: No tonsillar erythema, exudates, or enlargement; Moist mucous membranes, Good dentition, No lesions  NECK: Supple, No JVD, Normal thyroid  NERVOUS SYSTEM:  Alert & Oriented X3, Motor Strength 5/5 B/L upper and lower extremities; DTRs 2+ intact and symmetric  CHEST/LUNG: Clear to percussion bilaterally; No rales, rhonchi, wheezing, or rubs  HEART: Tachycardic; No murmurs, rubs, or gallops  ABDOMEN: Soft, Nontender, Nondistended; Bowel sounds present  EXTREMITIES:  2+ Peripheral Pulses, No clubbing, cyanosis, or edema  LYMPH: No lymphadenopathy noted  SKIN: No rashes or lesions    LABS/RADIOLOGY RESULTS:                          10.8   5.35  )-----------( 124      ( 20 Dec 2018 06:23 )             33.4   12-20    138  |  99  |  11  ----------------------------<  83  3.3<L>   |  29  |  0.82    Ca    8.0<L>      20 Dec 2018 06:23  Phos  3.1     12-20  Mg     1.9     12-20

## 2018-12-21 VITALS — DIASTOLIC BLOOD PRESSURE: 92 MMHG | SYSTOLIC BLOOD PRESSURE: 149 MMHG | HEART RATE: 110 BPM

## 2018-12-21 LAB
ALBUMIN SERPL ELPH-MCNC: 1.9 G/DL — LOW (ref 3.3–5)
ALP SERPL-CCNC: 135 U/L — HIGH (ref 40–120)
ALT FLD-CCNC: 15 U/L — SIGNIFICANT CHANGE UP (ref 12–78)
ANION GAP SERPL CALC-SCNC: 8 MMOL/L — SIGNIFICANT CHANGE UP (ref 5–17)
AST SERPL-CCNC: 32 U/L — SIGNIFICANT CHANGE UP (ref 15–37)
BILIRUB DIRECT SERPL-MCNC: 0.28 MG/DL — HIGH (ref 0.05–0.2)
BILIRUB INDIRECT FLD-MCNC: 0.3 MG/DL — SIGNIFICANT CHANGE UP (ref 0.2–1)
BILIRUB SERPL-MCNC: 0.6 MG/DL — SIGNIFICANT CHANGE UP (ref 0.2–1.2)
BUN SERPL-MCNC: 10 MG/DL — SIGNIFICANT CHANGE UP (ref 7–23)
CALCIUM SERPL-MCNC: 8.2 MG/DL — LOW (ref 8.5–10.1)
CHLORIDE SERPL-SCNC: 97 MMOL/L — SIGNIFICANT CHANGE UP (ref 96–108)
CO2 SERPL-SCNC: 29 MMOL/L — SIGNIFICANT CHANGE UP (ref 22–31)
CREAT SERPL-MCNC: 0.79 MG/DL — SIGNIFICANT CHANGE UP (ref 0.5–1.3)
GLUCOSE SERPL-MCNC: 270 MG/DL — HIGH (ref 70–99)
MAGNESIUM SERPL-MCNC: 1.8 MG/DL — SIGNIFICANT CHANGE UP (ref 1.6–2.6)
PHOSPHATE SERPL-MCNC: 2.6 MG/DL — SIGNIFICANT CHANGE UP (ref 2.5–4.5)
POTASSIUM SERPL-MCNC: 3.5 MMOL/L — SIGNIFICANT CHANGE UP (ref 3.5–5.3)
POTASSIUM SERPL-SCNC: 3.5 MMOL/L — SIGNIFICANT CHANGE UP (ref 3.5–5.3)
PROT SERPL-MCNC: 6.3 GM/DL — SIGNIFICANT CHANGE UP (ref 6–8.3)
SODIUM SERPL-SCNC: 134 MMOL/L — LOW (ref 135–145)

## 2018-12-21 PROCEDURE — 99239 HOSP IP/OBS DSCHRG MGMT >30: CPT

## 2018-12-21 RX ORDER — INSULIN LISPRO 100/ML
VIAL (ML) SUBCUTANEOUS
Qty: 0 | Refills: 0 | Status: DISCONTINUED | OUTPATIENT
Start: 2018-12-21 | End: 2018-12-21

## 2018-12-21 RX ORDER — FOLIC ACID 0.8 MG
1 TABLET ORAL
Qty: 30 | Refills: 0 | OUTPATIENT
Start: 2018-12-21

## 2018-12-21 RX ORDER — HYDRALAZINE HCL 50 MG
10 TABLET ORAL ONCE
Qty: 0 | Refills: 0 | Status: COMPLETED | OUTPATIENT
Start: 2018-12-21 | End: 2018-12-21

## 2018-12-21 RX ORDER — SODIUM CHLORIDE 9 MG/ML
1000 INJECTION, SOLUTION INTRAVENOUS
Qty: 0 | Refills: 0 | Status: DISCONTINUED | OUTPATIENT
Start: 2018-12-21 | End: 2018-12-21

## 2018-12-21 RX ORDER — MULTIVIT-MIN/FERROUS GLUCONATE 9 MG/15 ML
1 LIQUID (ML) ORAL
Qty: 30 | Refills: 0 | OUTPATIENT
Start: 2018-12-21

## 2018-12-21 RX ORDER — NICOTINE POLACRILEX 2 MG
1 GUM BUCCAL DAILY
Qty: 0 | Refills: 0 | Status: DISCONTINUED | OUTPATIENT
Start: 2018-12-21 | End: 2018-12-21

## 2018-12-21 RX ORDER — NICOTINE POLACRILEX 2 MG
4 GUM BUCCAL ONCE
Qty: 0 | Refills: 0 | Status: COMPLETED | OUTPATIENT
Start: 2018-12-21 | End: 2018-12-21

## 2018-12-21 RX ORDER — METOPROLOL TARTRATE 50 MG
1 TABLET ORAL
Qty: 30 | Refills: 0 | OUTPATIENT
Start: 2018-12-21

## 2018-12-21 RX ORDER — LACTOBACILLUS ACIDOPHILUS 100MM CELL
1 CAPSULE ORAL
Qty: 28 | Refills: 0 | OUTPATIENT
Start: 2018-12-21 | End: 2019-01-03

## 2018-12-21 RX ORDER — SODIUM,POTASSIUM PHOSPHATES 278-250MG
1 POWDER IN PACKET (EA) ORAL
Qty: 6 | Refills: 0 | OUTPATIENT
Start: 2018-12-21 | End: 2018-12-22

## 2018-12-21 RX ORDER — NICOTINE POLACRILEX 2 MG
1 GUM BUCCAL
Qty: 30 | Refills: 0 | OUTPATIENT
Start: 2018-12-21

## 2018-12-21 RX ORDER — INSULIN LISPRO 100/ML
VIAL (ML) SUBCUTANEOUS AT BEDTIME
Qty: 0 | Refills: 0 | Status: DISCONTINUED | OUTPATIENT
Start: 2018-12-21 | End: 2018-12-21

## 2018-12-21 RX ORDER — THIAMINE MONONITRATE (VIT B1) 100 MG
1 TABLET ORAL
Qty: 30 | Refills: 0 | OUTPATIENT
Start: 2018-12-21

## 2018-12-21 RX ORDER — SODIUM,POTASSIUM PHOSPHATES 278-250MG
1 POWDER IN PACKET (EA) ORAL
Qty: 0 | Refills: 0 | Status: DISCONTINUED | OUTPATIENT
Start: 2018-12-21 | End: 2018-12-21

## 2018-12-21 RX ORDER — GLIMEPIRIDE 1 MG
1 TABLET ORAL
Qty: 0 | Refills: 0 | COMMUNITY

## 2018-12-21 RX ADMIN — Medication 10 MILLIGRAM(S): at 17:22

## 2018-12-21 RX ADMIN — Medication 1 PATCH: at 08:00

## 2018-12-21 RX ADMIN — Medication 1 APPLICATION(S): at 17:25

## 2018-12-21 RX ADMIN — Medication 2 MILLIGRAM(S): at 00:29

## 2018-12-21 RX ADMIN — Medication 25 MILLIGRAM(S): at 06:00

## 2018-12-21 RX ADMIN — Medication 1 PATCH: at 19:45

## 2018-12-21 RX ADMIN — Medication 1 TABLET(S): at 12:03

## 2018-12-21 RX ADMIN — Medication 0.5 MILLIGRAM(S): at 13:45

## 2018-12-21 RX ADMIN — Medication 0.5 MILLIGRAM(S): at 02:14

## 2018-12-21 RX ADMIN — Medication 3: at 08:13

## 2018-12-21 RX ADMIN — Medication 1 PATCH: at 02:30

## 2018-12-21 RX ADMIN — Medication 1 MILLIGRAM(S): at 12:03

## 2018-12-21 RX ADMIN — Medication 1 TABLET(S): at 17:24

## 2018-12-21 RX ADMIN — Medication 0.5 MILLIGRAM(S): at 10:58

## 2018-12-21 RX ADMIN — Medication 25 MILLIGRAM(S): at 06:27

## 2018-12-21 RX ADMIN — Medication 100 MILLIGRAM(S): at 12:07

## 2018-12-21 RX ADMIN — Medication 0.5 MILLIGRAM(S): at 06:24

## 2018-12-21 RX ADMIN — Medication 1 TABLET(S): at 12:04

## 2018-12-21 RX ADMIN — Medication 1 APPLICATION(S): at 05:57

## 2018-12-21 RX ADMIN — Medication 1 TABLET(S): at 08:15

## 2018-12-21 RX ADMIN — Medication 4: at 12:07

## 2018-12-21 NOTE — DISCHARGE NOTE ADULT - SECONDARY DIAGNOSIS.
Chest pain, atypical Type 2 diabetes mellitus without complication, without long-term current use of insulin Electrolyte imbalance

## 2018-12-21 NOTE — PHYSICAL THERAPY INITIAL EVALUATION ADULT - ADDITIONAL COMMENTS
Pt states he lives in a house + stairs with his wife. I prior with all ADLs and ambulates with standard cane. Pt with scars and swelling on L ankle from bone break through ankle in August of 2017 from falling down the stairs

## 2018-12-21 NOTE — DISCHARGE NOTE ADULT - PATIENT PORTAL LINK FT
You can access the [facility], offered by [facility], by registering with the following website: [email or web address]

## 2018-12-21 NOTE — DISCHARGE NOTE ADULT - CARE PLAN
Principal Discharge DX:	Alcohol withdrawal syndrome without complication  Goal:	abstinence from alcohol  Assessment and plan of treatment:	Sepsis ruled out  Complete Ativan at home  Advised - No driving  Avoid alcohol  Outpatient alcohol treatment  Secondary Diagnosis:	Chest pain, atypical  Assessment and plan of treatment:	Cardiology follow-up as outpatient  Secondary Diagnosis:	Type 2 diabetes mellitus without complication, without long-term current use of insulin  Assessment and plan of treatment:	resume home Glimepiride  diet  Secondary Diagnosis:	Electrolyte imbalance  Assessment and plan of treatment:	Hypomagnesemia, Hypokalemia, Hyponatremia   Kphos ordered

## 2018-12-21 NOTE — DISCHARGE NOTE ADULT - MEDICATION SUMMARY - MEDICATIONS TO TAKE
I will START or STAY ON the medications listed below when I get home from the hospital:    see medication reconciliation form  --     -- Indication: For NA    Ativan 0.5 mg oral tablet  -- 1 tab(s) by mouth every 12 hours  - start at bedtime on 12/21/18 MDD:1 mg   -- Caution federal law prohibits the transfer of this drug to any person other  than the person for whom it was prescribed.  Do not take this drug if you are pregnant.  May cause drowsiness.  Alcohol may intensify this effect.  Use care when operating dangerous machinery.    -- Indication: For Alcohol withdrawal    glimepiride 4 mg oral tablet  -- 1 tab(s) by mouth 2 times a day  -- Indication: For Type 2 diabetes mellitus without complication, without long-term current use of insulin    metoprolol succinate 25 mg oral tablet, extended release  -- 1 tab(s) by mouth once a day  -- Indication: For hypertension    potassium phosphate-sodium phosphate 305 mg-700 mg oral tablet  -- 1 tab(s) by mouth 3 times a day (with meals)  -- Indication: For supplement    lactobacillus acidophilus oral capsule  -- 1 cap(s) by mouth 2 times a day   -- Indication: For probiotics    nicotine 14 mg/24 hr transdermal film, extended release  -- 1 patch by transdermal patch once a day   -- Indication: For smoking cessation    Multiple Vitamins with Minerals oral tablet  -- 1 tab(s) by mouth once a day  -- Indication: For Alcohol withdrawal    folic acid 1 mg oral tablet  -- 1 tab(s) by mouth once a day  -- Indication: For Alcohol withdrawal    thiamine 100 mg oral tablet  -- 1 tab(s) by mouth once a day  -- Indication: For Alcohol withdrawal

## 2018-12-21 NOTE — DISCHARGE NOTE ADULT - PLAN OF CARE
abstinence from alcohol Sepsis ruled out  Complete Ativan at home  Advised - No driving  Avoid alcohol  Outpatient alcohol treatment Cardiology follow-up as outpatient resume home Glimepiride  diet Hypomagnesemia, Hypokalemia, Hyponatremia   Kphos ordered

## 2018-12-21 NOTE — DISCHARGE NOTE ADULT - HOSPITAL COURSE
51 yo male with history of alcohol use presented with chest pain x 3 days. Patient stated his last alcoholic beverage was over one week ago. Patient states that chest pain has been constant and cannot provide pain scale number. Patient denied sob, abdominal pain.  Also complains of hiccups.  Admitted for chest pain and electrolyte imbalance along with ETOH withdrawal.  Treated with IV Ativan  Seen by ID and Cardiology    Advised outpatient Follow up with PCP and alcohol abstinence   No driving while taking Ativan and while drinking alcohol.    Vital Signs Last 24 Hrs  T(C): 36.4 (21 Dec 2018 06:06), Max: 36.6 (21 Dec 2018 00:52)  T(F): 97.5 (21 Dec 2018 06:06), Max: 97.8 (21 Dec 2018 00:52)  HR: 102 (21 Dec 2018 06:06) (85 - 105)  BP: 130/81 (21 Dec 2018 06:06) (130/81 - 150/74)  BP(mean): --  RR: 18 (21 Dec 2018 06:06) (15 - 20)  SpO2: 97% (21 Dec 2018 06:06) (97% - 99%)    PHYSICAL EXAM:  GENERAL: NAD, well-groomed, well-developed, weak  HEAD:  Atraumatic, Normocephalic  EYES: EOMI, PERRLA, conjunctiva and sclera clear  ENMT: No tonsillar erythema, exudates, or enlargement; Moist mucous membranes, Good dentition, No lesions  NECK: Supple, No JVD, Normal thyroid  NERVOUS SYSTEM:  Alert & Oriented X3, Motor Strength 5/5 B/L upper and lower extremities; DTRs 2+ intact and symmetric  CHEST/LUNG: Clear to percussion bilaterally; No rales, rhonchi, wheezing, or rubs  HEART: Tachycardic; No murmurs, rubs, or gallops  ABDOMEN: Soft, Nontender, Nondistended; Bowel sounds present  EXTREMITIES:  2+ Peripheral Pulses, No clubbing, cyanosis, or edema  LYMPH: No lymphadenopathy noted  SKIN: No rashes or lesions

## 2018-12-21 NOTE — DISCHARGE NOTE ADULT - MEDICATION SUMMARY - MEDICATIONS TO STOP TAKING
I will STOP taking the medications listed below when I get home from the hospital:    Augmentin  -- 1 tab(s) by mouth 2 times a day MDD:2

## 2018-12-21 NOTE — PHYSICAL THERAPY INITIAL EVALUATION ADULT - GENERAL OBSERVATIONS, REHAB EVAL
Pt encountered seated in chair in NAD, chair alarm and posey intact, contact precautions maintained.

## 2018-12-26 DIAGNOSIS — R11.10 VOMITING, UNSPECIFIED: ICD-10-CM

## 2018-12-26 DIAGNOSIS — E87.6 HYPOKALEMIA: ICD-10-CM

## 2018-12-26 DIAGNOSIS — R50.9 FEVER, UNSPECIFIED: ICD-10-CM

## 2018-12-26 DIAGNOSIS — E83.42 HYPOMAGNESEMIA: ICD-10-CM

## 2018-12-26 DIAGNOSIS — F10.239 ALCOHOL DEPENDENCE WITH WITHDRAWAL, UNSPECIFIED: ICD-10-CM

## 2018-12-26 DIAGNOSIS — I83.90 ASYMPTOMATIC VARICOSE VEINS OF UNSPECIFIED LOWER EXTREMITY: ICD-10-CM

## 2018-12-26 DIAGNOSIS — E11.9 TYPE 2 DIABETES MELLITUS WITHOUT COMPLICATIONS: ICD-10-CM

## 2018-12-26 DIAGNOSIS — R07.89 OTHER CHEST PAIN: ICD-10-CM

## 2018-12-26 DIAGNOSIS — R06.6 HICCOUGH: ICD-10-CM

## 2018-12-26 DIAGNOSIS — E87.1 HYPO-OSMOLALITY AND HYPONATREMIA: ICD-10-CM
